# Patient Record
Sex: FEMALE | URBAN - METROPOLITAN AREA
[De-identification: names, ages, dates, MRNs, and addresses within clinical notes are randomized per-mention and may not be internally consistent; named-entity substitution may affect disease eponyms.]

---

## 2024-08-01 ENCOUNTER — OFFICE VISIT (OUTPATIENT)
Dept: OBGYN CLINIC | Facility: CLINIC | Age: 24
End: 2024-08-01

## 2024-08-01 VITALS
HEIGHT: 64 IN | HEART RATE: 89 BPM | WEIGHT: 133.2 LBS | SYSTOLIC BLOOD PRESSURE: 108 MMHG | DIASTOLIC BLOOD PRESSURE: 72 MMHG | BODY MASS INDEX: 22.74 KG/M2

## 2024-08-01 DIAGNOSIS — Z30.09 ENCOUNTER FOR COUNSELING REGARDING CONTRACEPTION: ICD-10-CM

## 2024-08-01 DIAGNOSIS — Z30.40 SURVEILLANCE OF CONTRACEPTIVE IMPLANT: Primary | ICD-10-CM

## 2024-08-01 PROCEDURE — 99203 OFFICE O/P NEW LOW 30 MIN: CPT | Performed by: OBSTETRICS & GYNECOLOGY

## 2024-08-01 NOTE — PROGRESS NOTES
"Subjective    Silviano Mancuso is a 23 y.o. female who presents for contraception counseling.      Silviano currently has a Implanon in place.  She had her Implanon placed in July 2021 in the [place].  She reports that she has done well with her Implanon she denies any significant side effects.  She states that she does not have a period with the Implanon in place.  She is currently sexually active and is interested in discussing various contraception options.  She reports that she does not desire pregnancy at this time.  She denies any significant GYN concerns.  She endorses occasional lower abdominal pain that feels like something moving, likely GI in nature.  We discussed the option for pelvic ultrasound but she declines at this time.    She reports her last Pap smear was in 2021 and the [place] when she had her Implanon placed but she is uncertain of the results and unable to obtain these.  She would like to schedule a annual exam and have a Pap smear repeated so that she can get on my normal screening schedule here and ensure that her results are normal.    Silviano reports concern related to the fact that she has not had a period for 1 year with the Implanon in place.  We discussed that this sometimes occurs with the Implanon or other long-acting reversible contraceptives.  We discussed that if this is not bothersome to her it is an okay side effect given that we know the cause of the amenorrhea.  Silvinao expressed concern from family/friends that not having her period is dangerous.  We reviewed the physiologic of the female reproductive cycle and how to be important on improvement menstruation however maintains a thin endometrial stripe therefore making it an acceptable side effect.      Objective      /72 (BP Location: Left arm, Patient Position: Sitting)   Pulse 89   Ht 5' 3.5\" (1.613 m)   Wt 60.4 kg (133 lb 3.2 oz)   LMP  (LMP Unknown)   BMI 23.23 kg/m²     Physical " Exam  Constitutional:       General: She is not in acute distress.     Appearance: Normal appearance.   HENT:      Head: Normocephalic and atraumatic.   Cardiovascular:      Rate and Rhythm: Normal rate.   Pulmonary:      Effort: Pulmonary effort is normal.   Neurological:      General: No focal deficit present.      Mental Status: She is alert.   Skin:     General: Skin is warm and dry.         Depression Screening Follow-up Plan: Patient's depression screening was positive with a PHQ-9 score of 10. Clinically patient does not have depression. No treatment is required.    Assessment   23 y.o., currently with Implanon in place since 2021, now  per manufacture guidelines who presents to discuss contraception options    Plan  Patient has expressed desire for continued contraception.  We have discussed all methods of contraception including OCPs, Nuva Ring, Ortho Evra as combined contraceptives.  I have discussed that these methods include estrogens which can increase the risk of DVT/PE/Stroke, though this risk is much lower than the risk of this morbidity with pregnancy. I have discussed the small side effects that some patients experience including breast tenderness, bloating, mood changes, headaches.  The benefits of these contraceptives include shorter, lighter menstruation, less dysmenorrhea, acne reduction, potential decreased risk of ovarian cancer and ability to manipulate menses.  In addition, we have  depo provera, implant, LNG-IUD. The patient understands that she may experience irregular bleeding with each of these methods. Specifically with systemic progestins, she may experience an increase in appetite and potential weight gain.    With the long-acting reversible options, there is the additional risk of placement, migration and difficulty with removal.  We have also discussed the paraguard IUD, which is a nonhormonal option. The risks of this include risks of placement and migration.    We  have also discussed emergency contraception and how to obtain Plan B, Marily or a paraguard IUD should unprotected sex occur.    We have discussed that if she wishes her current Implanon can be removed and a Nexplanon can be placed at the same time versus the option to discontinue contraception and at this time or transition to another method as discussed above.  We discussed the option to wait to schedule future appointments until she has time to discuss her options with her family.  Information provided for [email or web address] which is also available in patient's preferred language of [place].    After extensive discussion patient would like to plan for Implanon removal and Nexplanon replacement.  Will schedule to return to office when device is in stock.  Will also plan to return for annual exam with Pap smear.    Discussed with Dr. Camron Lockhart MD  Obstetrics & Gynecology PGY-4  8/1/2024  9:45 AM

## 2024-09-26 ENCOUNTER — PROCEDURE VISIT (OUTPATIENT)
Dept: OBGYN CLINIC | Facility: CLINIC | Age: 24
End: 2024-09-26

## 2024-09-26 VITALS
WEIGHT: 132 LBS | SYSTOLIC BLOOD PRESSURE: 99 MMHG | HEART RATE: 70 BPM | BODY MASS INDEX: 23.02 KG/M2 | DIASTOLIC BLOOD PRESSURE: 66 MMHG

## 2024-09-26 DIAGNOSIS — Z30.40 SURVEILLANCE OF CONTRACEPTIVE IMPLANT: Primary | ICD-10-CM

## 2024-09-26 DIAGNOSIS — Z30.46 ENCOUNTER FOR REMOVAL AND REINSERTION OF NEXPLANON: ICD-10-CM

## 2024-09-26 RX ORDER — LIDOCAINE HYDROCHLORIDE AND EPINEPHRINE BITARTRATE 20; .01 MG/ML; MG/ML
4 INJECTION, SOLUTION SUBCUTANEOUS ONCE
Status: SHIPPED | OUTPATIENT
Start: 2024-09-26

## 2024-09-26 RX ORDER — LIDOCAINE HYDROCHLORIDE AND EPINEPHRINE BITARTRATE 20; .01 MG/ML; MG/ML
1 INJECTION, SOLUTION SUBCUTANEOUS ONCE
Status: DISCONTINUED | OUTPATIENT
Start: 2024-09-26 | End: 2024-09-26

## 2024-09-26 NOTE — PROGRESS NOTES
Universal Protocol:  Consent: Verbal consent obtained. Written consent obtained.  Risks and benefits: risks, benefits and alternatives were discussed  Consent given by: patient  Patient understanding: patient states understanding of the procedure being performed  Patient consent: the patient's understanding of the procedure matches consent given  Procedure consent: procedure consent matches procedure scheduled  Relevant documents: relevant documents present and verified  Test results: test results available and properly labeled  Site marked: the operative site was marked  Radiology Images displayed and confirmed. If images not available, report reviewed: imaging studies available  Required items: required blood products, implants, devices, and special equipment available  Patient identity confirmed: verbally with patient  Remove and insert drug implant    Date/Time: 9/26/2024 10:00 AM    Performed by: Amanda Lockhart MD  Authorized by: Amanda Lockhart MD    Indication:     Indication: Presence of non-biodegradable drug delivery implant    Pre-procedure:     Prepped with: povidone-iodine      Local anesthetic:  Lidocaine with epinephrine    The site was cleaned and prepped in a sterile fashion: yes    Procedure:     Procedure:  Removal with reinsertion    Small stab incision was made in arm: yes      Left/right:  Left    Preloaded contraceptive capsule trocar was placed subdermally: yes      Visualization of implant was obtained: yes      Contraceptive capsule was inserted and trocar removed: yes      Visualization of notch in stylet and palpation of device: yes      Palpation confirms placement by provider and patient: yes      Site was closed with steri-strips and pressure bandage applied: yes    Comments:      Nexplanon removal and reinsertion discussed in detail.  Risks of infection, bleeding, nerve injury all reviewed.  Patient understands risks and desires to proceed.  Verbal consent obtained.   Patient is 100% sure she wants the implanon removed and reinserted.  All questions answered.    Procedure:  Patient placed in dorsal supine with left arm above head, elbow flexed at 90 degrees, arm resting on examination table.  Nexplanon identified without problems.  Betadine scrub x3.  2 ml of 1% lidocaine with epinephrine injected under implanon device without problems.  Sterile gloves applied.  Small 0.5cm incision made at distal tip of Nexplanon device with 11 blade scalpel.  Nexplanon brought to incision.  Nexplanon removed intact without problems.  Pressure applied to incision.  Hemostasis obtained.  Steri-strips applied, followed by bandage and compression dressing.      Given location of original device to bicipital groove and underlying vasculature, the decision was made to select a new site for reinsertion. The new insertion site was chosen 8-10 cm medial to the medial epicondyle, and 3-5 cm posterior the sulcus between the bicep and tricep in order to avoid the vascular bundle and ulnar nerve. The skin was cleansed with alcohol for infiltration of 1% lidocaine with epinephrine and Betadine for insertion of the device.    The device was removed from its package. The implant was visible within the insertion device. The insertion was performed according to standard procedure: The tip of the  was passed through the skin, the skin was tented up, and the device was advanced until its full length was beneath the skin. The trigger was activated at and the sheath withdrawn. The implant was successfully deployed.    There was a small amount of bleeding from the skin edge which was controlled with pressure. The implant was palpable by me, the patient declined. Steri strips were placed over the insertion site and a pressure dressing was placed. The patient tolerated the procedure well.    Lot number: [id number]  Expiration: 10/2026      Patient tolerated procedure well.  No complications.

## 2024-09-26 NOTE — LETTER
September 26, 2024     Patient: Silviano Mancuso  YOB: 2000  Date of Visit: 9/26/2024      To Whom it May Concern:    Silviano Mancuso is under my professional care. Silviano was seen in my office on 9/26/2024. Silviano may return to school on 9/30/24 .    If you have any questions or concerns, please don't hesitate to call.         Sincerely,          Amanda Lockhart MD        CC: No Recipients

## 2025-01-17 ENCOUNTER — TELEPHONE (OUTPATIENT)
Dept: OBGYN CLINIC | Facility: CLINIC | Age: 25
End: 2025-01-17

## 2025-01-17 NOTE — TELEPHONE ENCOUNTER
Patient called stated she is having severe pelvic pain and if she can have an ultrasound appt here . Informed patient I  can schedule her with a provider and they can give a referral for an ultrasound . Patient stated she was in too much pain . Advised patient to go to [facility]. Patient verbalized understanding.